# Patient Record
Sex: MALE | Race: WHITE | NOT HISPANIC OR LATINO | Employment: OTHER | URBAN - METROPOLITAN AREA
[De-identification: names, ages, dates, MRNs, and addresses within clinical notes are randomized per-mention and may not be internally consistent; named-entity substitution may affect disease eponyms.]

---

## 2018-03-06 ENCOUNTER — OFFICE VISIT CONVERTED (OUTPATIENT)
Dept: SURGERY | Facility: CLINIC | Age: 68
End: 2018-03-06
Attending: SURGERY

## 2018-03-20 ENCOUNTER — OFFICE VISIT CONVERTED (OUTPATIENT)
Dept: SURGERY | Facility: CLINIC | Age: 68
End: 2018-03-20
Attending: SURGERY

## 2018-07-11 ENCOUNTER — CONVERSION ENCOUNTER (OUTPATIENT)
Dept: GASTROENTEROLOGY | Facility: CLINIC | Age: 68
End: 2018-07-11

## 2018-07-11 ENCOUNTER — OFFICE VISIT CONVERTED (OUTPATIENT)
Dept: GASTROENTEROLOGY | Facility: CLINIC | Age: 68
End: 2018-07-11
Attending: NURSE PRACTITIONER

## 2019-04-25 ENCOUNTER — HOSPITAL ENCOUNTER (OUTPATIENT)
Dept: CARDIAC REHAB | Facility: HOSPITAL | Age: 69
Setting detail: RECURRING SERIES
Discharge: HOME | End: 2019-10-08

## 2020-01-29 ENCOUNTER — OFFICE VISIT CONVERTED (OUTPATIENT)
Dept: SURGERY | Facility: CLINIC | Age: 70
End: 2020-01-29
Attending: SURGERY

## 2020-02-05 ENCOUNTER — OFFICE VISIT CONVERTED (OUTPATIENT)
Dept: SURGERY | Facility: CLINIC | Age: 70
End: 2020-02-05
Attending: SURGERY

## 2020-02-20 ENCOUNTER — HOSPITAL ENCOUNTER (OUTPATIENT)
Dept: OTHER | Facility: HOSPITAL | Age: 70
Discharge: HOME | End: 2020-02-20
Attending: NURSE PRACTITIONER

## 2020-02-20 LAB
C DIFF TOX B STL QL CT TISS CULT: POSITIVE
CONV 027 TOXIN: NEGATIVE

## 2020-02-22 LAB — BACTERIA SPEC AEROBE CULT: NORMAL

## 2020-02-26 ENCOUNTER — OFFICE VISIT CONVERTED (OUTPATIENT)
Dept: SURGERY | Facility: CLINIC | Age: 70
End: 2020-02-26
Attending: SURGERY

## 2021-02-24 ENCOUNTER — HOSPITAL ENCOUNTER (OUTPATIENT)
Dept: VACCINE CLINIC | Facility: HOSPITAL | Age: 71
Discharge: HOME | End: 2021-02-24
Attending: INTERNAL MEDICINE

## 2021-03-25 ENCOUNTER — HOSPITAL ENCOUNTER (OUTPATIENT)
Dept: VACCINE CLINIC | Facility: HOSPITAL | Age: 71
Discharge: HOME | End: 2021-03-25
Attending: INTERNAL MEDICINE

## 2021-04-01 ENCOUNTER — HOSPITAL ENCOUNTER (OUTPATIENT)
Dept: OTHER | Facility: HOSPITAL | Age: 71
Discharge: HOME | End: 2021-04-01
Attending: NURSE PRACTITIONER

## 2021-05-15 VITALS — RESPIRATION RATE: 14 BRPM | BODY MASS INDEX: 26.48 KG/M2 | WEIGHT: 185 LBS | HEIGHT: 70 IN

## 2021-05-15 VITALS — HEIGHT: 70 IN | RESPIRATION RATE: 14 BRPM | BODY MASS INDEX: 27.79 KG/M2 | WEIGHT: 194.12 LBS

## 2021-05-15 VITALS — HEIGHT: 70 IN | WEIGHT: 185 LBS | RESPIRATION RATE: 14 BRPM | BODY MASS INDEX: 26.48 KG/M2

## 2021-05-16 VITALS — BODY MASS INDEX: 28.77 KG/M2 | HEIGHT: 70 IN | WEIGHT: 201 LBS

## 2021-05-16 VITALS
BODY MASS INDEX: 30.64 KG/M2 | TEMPERATURE: 98.8 F | HEART RATE: 68 BPM | DIASTOLIC BLOOD PRESSURE: 55 MMHG | SYSTOLIC BLOOD PRESSURE: 136 MMHG | HEIGHT: 70 IN | WEIGHT: 214 LBS

## 2021-05-16 VITALS — WEIGHT: 201 LBS | HEIGHT: 70 IN | BODY MASS INDEX: 28.77 KG/M2

## 2022-07-21 ENCOUNTER — LAB (OUTPATIENT)
Dept: LAB | Facility: HOSPITAL | Age: 72
End: 2022-07-21

## 2022-07-21 ENCOUNTER — OFFICE VISIT (OUTPATIENT)
Dept: NEUROLOGY | Facility: CLINIC | Age: 72
End: 2022-07-21

## 2022-07-21 VITALS
HEIGHT: 70 IN | HEART RATE: 58 BPM | SYSTOLIC BLOOD PRESSURE: 134 MMHG | DIASTOLIC BLOOD PRESSURE: 74 MMHG | BODY MASS INDEX: 30.11 KG/M2 | WEIGHT: 210.3 LBS

## 2022-07-21 DIAGNOSIS — G60.8 IDIOPATHIC SENSORIMOTOR AXONAL NEUROPATHY: Primary | ICD-10-CM

## 2022-07-21 DIAGNOSIS — G60.8 IDIOPATHIC SENSORIMOTOR AXONAL NEUROPATHY: ICD-10-CM

## 2022-07-21 LAB
ALBUMIN SERPL-MCNC: 4.6 G/DL (ref 3.5–5.2)
ALBUMIN/GLOB SERPL: 1.9 G/DL
ALP SERPL-CCNC: 85 U/L (ref 39–117)
ALT SERPL W P-5'-P-CCNC: 22 U/L (ref 1–41)
ANION GAP SERPL CALCULATED.3IONS-SCNC: 11.3 MMOL/L (ref 5–15)
AST SERPL-CCNC: 26 U/L (ref 1–40)
BILIRUB SERPL-MCNC: 0.6 MG/DL (ref 0–1.2)
BUN SERPL-MCNC: 14 MG/DL (ref 8–23)
BUN/CREAT SERPL: 12.2 (ref 7–25)
CALCIUM SPEC-SCNC: 9.3 MG/DL (ref 8.6–10.5)
CHLORIDE SERPL-SCNC: 103 MMOL/L (ref 98–107)
CO2 SERPL-SCNC: 25.7 MMOL/L (ref 22–29)
CREAT SERPL-MCNC: 1.15 MG/DL (ref 0.76–1.27)
CRP SERPL-MCNC: <0.3 MG/DL (ref 0–0.5)
DEPRECATED RDW RBC AUTO: 39.7 FL (ref 37–54)
EGFRCR SERPLBLD CKD-EPI 2021: 66.8 ML/MIN/1.73
ERYTHROCYTE [DISTWIDTH] IN BLOOD BY AUTOMATED COUNT: 12 % (ref 12.3–15.4)
ERYTHROCYTE [SEDIMENTATION RATE] IN BLOOD: 14 MM/HR (ref 0–20)
GLOBULIN UR ELPH-MCNC: 2.4 GM/DL
GLUCOSE SERPL-MCNC: 96 MG/DL (ref 65–99)
HCT VFR BLD AUTO: 44.2 % (ref 37.5–51)
HGB BLD-MCNC: 15.3 G/DL (ref 13–17.7)
MCH RBC QN AUTO: 31.5 PG (ref 26.6–33)
MCHC RBC AUTO-ENTMCNC: 34.6 G/DL (ref 31.5–35.7)
MCV RBC AUTO: 91.1 FL (ref 79–97)
PLATELET # BLD AUTO: 231 10*3/MM3 (ref 140–450)
PMV BLD AUTO: 9.9 FL (ref 6–12)
POTASSIUM SERPL-SCNC: 4.4 MMOL/L (ref 3.5–5.2)
PROT SERPL-MCNC: 7 G/DL (ref 6–8.5)
RBC # BLD AUTO: 4.85 10*6/MM3 (ref 4.14–5.8)
SODIUM SERPL-SCNC: 140 MMOL/L (ref 136–145)
WBC NRBC COR # BLD: 7.1 10*3/MM3 (ref 3.4–10.8)

## 2022-07-21 PROCEDURE — 85652 RBC SED RATE AUTOMATED: CPT

## 2022-07-21 PROCEDURE — 36415 COLL VENOUS BLD VENIPUNCTURE: CPT

## 2022-07-21 PROCEDURE — 84165 PROTEIN E-PHORESIS SERUM: CPT

## 2022-07-21 PROCEDURE — 86235 NUCLEAR ANTIGEN ANTIBODY: CPT

## 2022-07-21 PROCEDURE — 85027 COMPLETE CBC AUTOMATED: CPT

## 2022-07-21 PROCEDURE — 82607 VITAMIN B-12: CPT

## 2022-07-21 PROCEDURE — 83921 ORGANIC ACID SINGLE QUANT: CPT

## 2022-07-21 PROCEDURE — 86225 DNA ANTIBODY NATIVE: CPT

## 2022-07-21 PROCEDURE — 84425 ASSAY OF VITAMIN B-1: CPT

## 2022-07-21 PROCEDURE — 99204 OFFICE O/P NEW MOD 45 MIN: CPT | Performed by: NURSE PRACTITIONER

## 2022-07-21 PROCEDURE — 86334 IMMUNOFIX E-PHORESIS SERUM: CPT

## 2022-07-21 PROCEDURE — 82784 ASSAY IGA/IGD/IGG/IGM EACH: CPT

## 2022-07-21 PROCEDURE — 86140 C-REACTIVE PROTEIN: CPT

## 2022-07-21 PROCEDURE — 82746 ASSAY OF FOLIC ACID SERUM: CPT

## 2022-07-21 PROCEDURE — 80053 COMPREHEN METABOLIC PANEL: CPT

## 2022-07-21 RX ORDER — CLOPIDOGREL BISULFATE 75 MG/1
1 TABLET ORAL DAILY
COMMUNITY
Start: 2022-02-17

## 2022-07-21 RX ORDER — CHOLECALCIFEROL (VITAMIN D3) 50 MCG
2000 TABLET ORAL DAILY
COMMUNITY

## 2022-07-21 RX ORDER — LISINOPRIL AND HYDROCHLOROTHIAZIDE 20; 12.5 MG/1; MG/1
1 TABLET ORAL DAILY
COMMUNITY
Start: 2022-02-17

## 2022-07-21 RX ORDER — METOPROLOL SUCCINATE 50 MG/1
50 TABLET, EXTENDED RELEASE ORAL DAILY
COMMUNITY

## 2022-07-21 RX ORDER — AMLODIPINE BESYLATE 5 MG/1
1 TABLET ORAL DAILY
COMMUNITY
Start: 2022-02-17

## 2022-07-21 RX ORDER — AMPICILLIN TRIHYDRATE 250 MG
CAPSULE ORAL
COMMUNITY

## 2022-07-21 RX ORDER — ATORVASTATIN CALCIUM 40 MG/1
1 TABLET, FILM COATED ORAL DAILY
COMMUNITY
Start: 2022-02-17

## 2022-07-21 RX ORDER — MULTIPLE VITAMINS W/ MINERALS TAB 9MG-400MCG
1 TAB ORAL DAILY
COMMUNITY

## 2022-07-21 RX ORDER — ASPIRIN 81 MG/1
81 TABLET ORAL DAILY
COMMUNITY

## 2022-07-21 RX ORDER — PANTOPRAZOLE SODIUM 20 MG/1
20 TABLET, DELAYED RELEASE ORAL DAILY
COMMUNITY
Start: 2022-06-13

## 2022-07-22 LAB
FOLATE SERPL-MCNC: >20 NG/ML (ref 4.78–24.2)
VIT B12 BLD-MCNC: 670 PG/ML (ref 211–946)

## 2022-07-23 LAB
CENTROMERE B AB SER-ACNC: <0.2 AI (ref 0–0.9)
CHROMATIN AB SERPL-ACNC: <0.2 AI (ref 0–0.9)
DSDNA AB SER-ACNC: 1 IU/ML (ref 0–9)
ENA JO1 AB SER-ACNC: <0.2 AI (ref 0–0.9)
ENA RNP AB SER-ACNC: <0.2 AI (ref 0–0.9)
ENA SCL70 AB SER-ACNC: <0.2 AI (ref 0–0.9)
ENA SM AB SER-ACNC: <0.2 AI (ref 0–0.9)
ENA SS-A AB SER-ACNC: <0.2 AI (ref 0–0.9)
ENA SS-B AB SER-ACNC: <0.2 AI (ref 0–0.9)
Lab: NORMAL

## 2022-07-25 LAB
ALBUMIN SERPL ELPH-MCNC: 4 G/DL (ref 2.9–4.4)
ALBUMIN/GLOB SERPL: 1.3 {RATIO} (ref 0.7–1.7)
ALPHA1 GLOB SERPL ELPH-MCNC: 0.3 G/DL (ref 0–0.4)
ALPHA2 GLOB SERPL ELPH-MCNC: 0.8 G/DL (ref 0.4–1)
B-GLOBULIN SERPL ELPH-MCNC: 1 G/DL (ref 0.7–1.3)
GAMMA GLOB SERPL ELPH-MCNC: 0.9 G/DL (ref 0.4–1.8)
GLOBULIN SER CALC-MCNC: 3 G/DL (ref 2.2–3.9)
IGA SERPL-MCNC: 177 MG/DL (ref 61–437)
IGG SERPL-MCNC: 795 MG/DL (ref 603–1613)
IGM SERPL-MCNC: 84 MG/DL (ref 15–143)
LABORATORY COMMENT REPORT: NORMAL
M PROTEIN SERPL ELPH-MCNC: NORMAL G/DL
PROT PATTERN SERPL ELPH-IMP: NORMAL
PROT PATTERN SERPL IFE-IMP: NORMAL
PROT SERPL-MCNC: 7 G/DL (ref 6–8.5)

## 2022-07-26 LAB — VIT B1 BLD-SCNC: 177.5 NMOL/L (ref 66.5–200)

## 2022-07-27 LAB — METHYLMALONATE SERPL-SCNC: 173 NMOL/L (ref 0–378)

## 2022-09-21 ENCOUNTER — OFFICE VISIT (OUTPATIENT)
Dept: NEUROLOGY | Facility: CLINIC | Age: 72
End: 2022-09-21

## 2022-09-21 VITALS
SYSTOLIC BLOOD PRESSURE: 142 MMHG | HEIGHT: 70 IN | BODY MASS INDEX: 30.45 KG/M2 | HEART RATE: 58 BPM | DIASTOLIC BLOOD PRESSURE: 69 MMHG | WEIGHT: 212.7 LBS

## 2022-09-21 DIAGNOSIS — I73.9 INTERMITTENT CLAUDICATION: ICD-10-CM

## 2022-09-21 DIAGNOSIS — G60.8 IDIOPATHIC SENSORIMOTOR AXONAL NEUROPATHY: Primary | ICD-10-CM

## 2022-09-21 PROCEDURE — 99214 OFFICE O/P EST MOD 30 MIN: CPT | Performed by: NURSE PRACTITIONER

## 2022-09-23 PROBLEM — I73.9 INTERMITTENT CLAUDICATION: Status: ACTIVE | Noted: 2022-09-23

## 2022-10-12 ENCOUNTER — HOSPITAL ENCOUNTER (OUTPATIENT)
Dept: CARDIOLOGY | Facility: HOSPITAL | Age: 72
Discharge: HOME | End: 2022-10-12
Admitting: NURSE PRACTITIONER

## 2022-10-12 DIAGNOSIS — I73.9 INTERMITTENT CLAUDICATION: ICD-10-CM

## 2022-10-12 LAB
BH CV LOWER ARTERIAL LEFT DORSALIS PEDIS SYS MAX: 169
BH CV LOWER ARTERIAL LEFT GREAT TOE SYS MAX: 149
BH CV LOWER ARTERIAL LEFT POST TIBIAL SYS MAX: 155
BH CV LOWER ARTERIAL RIGHT DORSALIS PEDIS SYS MAX: 167
BH CV LOWER ARTERIAL RIGHT GREAT TOE SYS MAX: 121
BH CV LOWER ARTERIAL RIGHT POST TIBIAL SYS MAX: 168
MAXIMAL PREDICTED HEART RATE: 146 BPM
STRESS TARGET HR: 124 BPM
UPPER ARTERIAL LEFT ARM BRACHIAL SYS MAX: 121 MMHG
UPPER ARTERIAL RIGHT ARM BRACHIAL SYS MAX: 132 MMHG

## 2022-10-12 PROCEDURE — 93922 UPR/L XTREMITY ART 2 LEVELS: CPT | Performed by: SURGERY

## 2022-10-12 PROCEDURE — 93922 UPR/L XTREMITY ART 2 LEVELS: CPT

## 2022-11-22 ENCOUNTER — OFFICE VISIT (OUTPATIENT)
Dept: NEUROLOGY | Facility: CLINIC | Age: 72
End: 2022-11-22

## 2022-11-22 VITALS
SYSTOLIC BLOOD PRESSURE: 134 MMHG | HEIGHT: 70 IN | DIASTOLIC BLOOD PRESSURE: 68 MMHG | WEIGHT: 216.5 LBS | HEART RATE: 78 BPM | BODY MASS INDEX: 30.99 KG/M2

## 2022-11-22 DIAGNOSIS — G60.8 IDIOPATHIC SENSORIMOTOR AXONAL NEUROPATHY: Primary | ICD-10-CM

## 2022-11-22 PROBLEM — I73.9 INTERMITTENT CLAUDICATION (HCC): Status: RESOLVED | Noted: 2022-09-23 | Resolved: 2022-11-22

## 2022-11-22 PROCEDURE — 99214 OFFICE O/P EST MOD 30 MIN: CPT | Performed by: NURSE PRACTITIONER

## 2022-11-22 RX ORDER — GABAPENTIN 300 MG/1
300 CAPSULE ORAL DAILY
Qty: 90 CAPSULE | Refills: 1 | Status: SHIPPED | OUTPATIENT
Start: 2022-11-22 | End: 2023-03-22 | Stop reason: SDUPTHER

## 2023-03-17 ENCOUNTER — CLINICAL SUPPORT (OUTPATIENT)
Dept: GASTROENTEROLOGY | Facility: CLINIC | Age: 73
End: 2023-03-17

## 2023-03-17 ENCOUNTER — PREP FOR SURGERY (OUTPATIENT)
Dept: OTHER | Facility: HOSPITAL | Age: 73
End: 2023-03-17
Payer: MEDICARE

## 2023-03-17 DIAGNOSIS — Z86.010 HISTORY OF COLON POLYPS: Primary | ICD-10-CM

## 2023-03-20 PROBLEM — Z86.010 HISTORY OF COLON POLYPS: Status: ACTIVE | Noted: 2023-03-20

## 2023-03-20 PROBLEM — Z86.0100 HISTORY OF COLON POLYPS: Status: ACTIVE | Noted: 2023-03-20

## 2023-03-22 ENCOUNTER — OFFICE VISIT (OUTPATIENT)
Dept: NEUROLOGY | Facility: CLINIC | Age: 73
End: 2023-03-22
Payer: MEDICARE

## 2023-03-22 VITALS
HEART RATE: 77 BPM | BODY MASS INDEX: 32.35 KG/M2 | DIASTOLIC BLOOD PRESSURE: 74 MMHG | HEIGHT: 70 IN | SYSTOLIC BLOOD PRESSURE: 145 MMHG | WEIGHT: 226 LBS

## 2023-03-22 DIAGNOSIS — G60.8 IDIOPATHIC SENSORIMOTOR AXONAL NEUROPATHY: Primary | ICD-10-CM

## 2023-03-22 PROCEDURE — 1159F MED LIST DOCD IN RCRD: CPT | Performed by: NURSE PRACTITIONER

## 2023-03-22 PROCEDURE — 1160F RVW MEDS BY RX/DR IN RCRD: CPT | Performed by: NURSE PRACTITIONER

## 2023-03-22 PROCEDURE — 99214 OFFICE O/P EST MOD 30 MIN: CPT | Performed by: NURSE PRACTITIONER

## 2023-03-22 RX ORDER — GABAPENTIN 300 MG/1
300 CAPSULE ORAL 3 TIMES DAILY
Qty: 30 CAPSULE | Refills: 5 | Status: SHIPPED | OUTPATIENT
Start: 2023-03-22

## 2023-03-23 ENCOUNTER — TELEPHONE (OUTPATIENT)
Dept: GASTROENTEROLOGY | Facility: CLINIC | Age: 73
End: 2023-03-23
Payer: MEDICARE

## 2023-04-26 ENCOUNTER — TELEPHONE (OUTPATIENT)
Dept: NEUROLOGY | Facility: CLINIC | Age: 73
End: 2023-04-26
Payer: MEDICARE

## 2023-04-27 DIAGNOSIS — G60.8 IDIOPATHIC SENSORIMOTOR AXONAL NEUROPATHY: ICD-10-CM

## 2023-04-27 RX ORDER — GABAPENTIN 300 MG/1
300 CAPSULE ORAL 3 TIMES DAILY
Qty: 90 CAPSULE | Refills: 5 | Status: SHIPPED | OUTPATIENT
Start: 2023-04-27

## 2023-06-15 ENCOUNTER — HOSPITAL ENCOUNTER (OUTPATIENT)
Facility: HOSPITAL | Age: 73
Setting detail: HOSPITAL OUTPATIENT SURGERY
Discharge: HOME | End: 2023-06-15
Attending: INTERNAL MEDICINE | Admitting: INTERNAL MEDICINE
Payer: MEDICARE

## 2023-06-15 ENCOUNTER — ANESTHESIA EVENT (OUTPATIENT)
Dept: GASTROENTEROLOGY | Facility: HOSPITAL | Age: 73
End: 2023-06-15
Payer: MEDICARE

## 2023-06-15 ENCOUNTER — ANESTHESIA (OUTPATIENT)
Dept: GASTROENTEROLOGY | Facility: HOSPITAL | Age: 73
End: 2023-06-15
Payer: MEDICARE

## 2023-06-15 VITALS
SYSTOLIC BLOOD PRESSURE: 86 MMHG | TEMPERATURE: 98.7 F | BODY MASS INDEX: 30.3 KG/M2 | RESPIRATION RATE: 15 BRPM | WEIGHT: 211.64 LBS | HEART RATE: 71 BPM | DIASTOLIC BLOOD PRESSURE: 64 MMHG | HEIGHT: 70 IN | OXYGEN SATURATION: 87 %

## 2023-06-15 DIAGNOSIS — Z86.010 HISTORY OF COLON POLYPS: ICD-10-CM

## 2023-06-15 PROCEDURE — 88305 TISSUE EXAM BY PATHOLOGIST: CPT | Performed by: INTERNAL MEDICINE

## 2023-06-15 PROCEDURE — 25010000002 PROPOFOL 10 MG/ML EMULSION: Performed by: NURSE ANESTHETIST, CERTIFIED REGISTERED

## 2023-06-15 RX ORDER — SODIUM CHLORIDE, SODIUM LACTATE, POTASSIUM CHLORIDE, CALCIUM CHLORIDE 600; 310; 30; 20 MG/100ML; MG/100ML; MG/100ML; MG/100ML
30 INJECTION, SOLUTION INTRAVENOUS CONTINUOUS
Status: DISCONTINUED | OUTPATIENT
Start: 2023-06-15 | End: 2023-06-15 | Stop reason: HOSPADM

## 2023-06-15 RX ORDER — PROPOFOL 10 MG/ML
VIAL (ML) INTRAVENOUS AS NEEDED
Status: DISCONTINUED | OUTPATIENT
Start: 2023-06-15 | End: 2023-06-15 | Stop reason: SURG

## 2023-06-15 RX ORDER — LIDOCAINE HYDROCHLORIDE 20 MG/ML
INJECTION, SOLUTION EPIDURAL; INFILTRATION; INTRACAUDAL; PERINEURAL AS NEEDED
Status: DISCONTINUED | OUTPATIENT
Start: 2023-06-15 | End: 2023-06-15 | Stop reason: SURG

## 2023-06-15 RX ADMIN — PROPOFOL 200 MCG/KG/MIN: 10 INJECTION, EMULSION INTRAVENOUS at 10:36

## 2023-06-15 RX ADMIN — SODIUM CHLORIDE, POTASSIUM CHLORIDE, SODIUM LACTATE AND CALCIUM CHLORIDE: 600; 310; 30; 20 INJECTION, SOLUTION INTRAVENOUS at 10:35

## 2023-06-15 RX ADMIN — LIDOCAINE HYDROCHLORIDE 100 MG: 20 INJECTION, SOLUTION EPIDURAL; INFILTRATION; INTRACAUDAL; PERINEURAL at 10:35

## 2023-06-15 RX ADMIN — PROPOFOL 100 MG: 10 INJECTION, EMULSION INTRAVENOUS at 10:35

## 2023-06-16 LAB
CYTO UR: NORMAL
LAB AP CASE REPORT: NORMAL
LAB AP CLINICAL INFORMATION: NORMAL
PATH REPORT.FINAL DX SPEC: NORMAL
PATH REPORT.GROSS SPEC: NORMAL

## 2023-08-02 ENCOUNTER — OFFICE VISIT (OUTPATIENT)
Dept: NEUROLOGY | Facility: CLINIC | Age: 73
End: 2023-08-02
Payer: MEDICARE

## 2023-08-02 VITALS
WEIGHT: 218.7 LBS | BODY MASS INDEX: 31.31 KG/M2 | SYSTOLIC BLOOD PRESSURE: 133 MMHG | DIASTOLIC BLOOD PRESSURE: 72 MMHG | HEART RATE: 56 BPM | HEIGHT: 70 IN

## 2023-08-02 DIAGNOSIS — G60.8 IDIOPATHIC SENSORIMOTOR AXONAL NEUROPATHY: ICD-10-CM

## 2023-08-02 RX ORDER — GABAPENTIN 300 MG/1
300 CAPSULE ORAL 3 TIMES DAILY
Qty: 90 CAPSULE | Refills: 5 | Status: SHIPPED | OUTPATIENT
Start: 2023-08-02

## 2024-01-24 ENCOUNTER — TELEPHONE (OUTPATIENT)
Dept: NEUROLOGY | Facility: CLINIC | Age: 74
End: 2024-01-24
Payer: MEDICARE

## 2024-01-24 DIAGNOSIS — G60.8 IDIOPATHIC SENSORIMOTOR AXONAL NEUROPATHY: ICD-10-CM

## 2024-01-24 RX ORDER — GABAPENTIN 300 MG/1
300 CAPSULE ORAL 3 TIMES DAILY
Qty: 90 CAPSULE | Refills: 5 | OUTPATIENT
Start: 2024-01-24

## 2024-01-31 DIAGNOSIS — G60.8 IDIOPATHIC SENSORIMOTOR AXONAL NEUROPATHY: ICD-10-CM

## 2024-01-31 RX ORDER — GABAPENTIN 300 MG/1
300 CAPSULE ORAL 3 TIMES DAILY
Qty: 90 CAPSULE | Refills: 0 | Status: SHIPPED | OUTPATIENT
Start: 2024-01-31

## 2024-02-08 ENCOUNTER — OFFICE VISIT (OUTPATIENT)
Dept: NEUROLOGY | Facility: CLINIC | Age: 74
End: 2024-02-08
Payer: MEDICARE

## 2024-02-08 VITALS
BODY MASS INDEX: 32.21 KG/M2 | WEIGHT: 225 LBS | DIASTOLIC BLOOD PRESSURE: 67 MMHG | HEIGHT: 70 IN | SYSTOLIC BLOOD PRESSURE: 131 MMHG | HEART RATE: 57 BPM

## 2024-02-08 DIAGNOSIS — G60.8 IDIOPATHIC SENSORIMOTOR AXONAL NEUROPATHY: Primary | ICD-10-CM

## 2024-02-08 PROCEDURE — 99213 OFFICE O/P EST LOW 20 MIN: CPT | Performed by: NURSE PRACTITIONER

## 2024-02-08 PROCEDURE — 1160F RVW MEDS BY RX/DR IN RCRD: CPT | Performed by: NURSE PRACTITIONER

## 2024-02-08 PROCEDURE — 1159F MED LIST DOCD IN RCRD: CPT | Performed by: NURSE PRACTITIONER

## 2024-02-08 RX ORDER — DIPHENOXYLATE HYDROCHLORIDE AND ATROPINE SULFATE 2.5; .025 MG/1; MG/1
1 TABLET ORAL DAILY
COMMUNITY

## 2024-02-08 RX ORDER — GABAPENTIN 300 MG/1
300 CAPSULE ORAL 3 TIMES DAILY
Qty: 90 CAPSULE | Refills: 5 | Status: SHIPPED | OUTPATIENT
Start: 2024-02-08

## 2024-08-27 DIAGNOSIS — G60.8 IDIOPATHIC SENSORIMOTOR AXONAL NEUROPATHY: ICD-10-CM

## 2024-08-28 RX ORDER — GABAPENTIN 300 MG/1
300 CAPSULE ORAL 3 TIMES DAILY
Qty: 90 CAPSULE | Refills: 3 | Status: SHIPPED | OUTPATIENT
Start: 2024-08-28

## 2024-11-07 ENCOUNTER — OFFICE VISIT (OUTPATIENT)
Dept: NEUROLOGY | Facility: CLINIC | Age: 74
End: 2024-11-07
Payer: MEDICARE

## 2024-11-07 VITALS
BODY MASS INDEX: 31.95 KG/M2 | WEIGHT: 223.2 LBS | HEART RATE: 63 BPM | HEIGHT: 70 IN | DIASTOLIC BLOOD PRESSURE: 64 MMHG | SYSTOLIC BLOOD PRESSURE: 131 MMHG

## 2024-11-07 DIAGNOSIS — G60.8 IDIOPATHIC SENSORIMOTOR AXONAL NEUROPATHY: Primary | ICD-10-CM

## 2024-11-07 RX ORDER — GABAPENTIN 300 MG/1
300 CAPSULE ORAL 3 TIMES DAILY
Qty: 90 CAPSULE | Refills: 5 | Status: SHIPPED | OUTPATIENT
Start: 2024-11-07

## 2024-11-07 NOTE — PROGRESS NOTES
"Chief Complaint  Neurologic Problem    Subjective          Ruiz Boyd presents to [facility]  History of Present Illness    History of Present Illness  The patient is a 74-year-old male who presents to the office for follow-up for neuropathy, remains on gabapentin 300 mg up to three times a day.    He reports that his condition is stable and he is managing well. He continues to take gabapentin twice daily, occasionally increasing to three times daily. He notes that his pain intensifies at night if he has not engaged in sufficient physical activity during the day.       Objective   Vital Signs:   /64   Pulse 63   Ht 177.8 cm (70\")   Wt 101 kg (223 lb 3.2 oz)   BMI 32.03 kg/m²     Physical Exam  HENT:      Head: Normocephalic.   Pulmonary:      Effort: Pulmonary effort is normal.   Neurological:      Mental Status: He is alert and oriented to person, place, and time.      Motor: Motor function is intact.      Neurological Exam  Mental Status  Alert. Oriented to person, place, and time.      Result Review :               Assessment and Plan    Diagnoses and all orders for this visit:    1. Idiopathic sensorimotor axonal neuropathy (Primary)  -     gabapentin (NEURONTIN) 300 MG capsule; Take 1 capsule by mouth 3 (Three) Times a Day.  Dispense: 90 capsule; Refill: 5        Assessment & Plan  1. Neuropathy.  The current regimen of gabapentin 300 mg up to three times a day will be maintained. There is room to increase the dosage in the future if needed. A refill of the prescription will be sent to the mail order pharmacy. He is advised to inform if his pain becomes poorly controlled or if there are any changes in his condition.           Follow Up   Return in about 6 months (around 5/7/2025) for neuropathy.  Patient was given instructions and counseling regarding his condition or for health maintenance advice. Please see specific information pulled into the AVS if " appropriate.       Patient or patient representative verbalized consent for the use of Ambient Listening during the visit with  BRITTANEY Hitchcock for chart documentation. 11/7/2024  08:22 EST

## 2025-01-14 ENCOUNTER — OFFICE VISIT (OUTPATIENT)
Dept: FAMILY MEDICINE CLINIC | Age: 75
End: 2025-01-14
Payer: MEDICARE

## 2025-01-14 ENCOUNTER — HOSPITAL ENCOUNTER (OUTPATIENT)
Dept: GENERAL RADIOLOGY | Facility: HOSPITAL | Age: 75
Discharge: HOME | End: 2025-01-14
Admitting: NURSE PRACTITIONER
Payer: MEDICARE

## 2025-01-14 VITALS
HEART RATE: 56 BPM | BODY MASS INDEX: 32.13 KG/M2 | TEMPERATURE: 98.2 F | DIASTOLIC BLOOD PRESSURE: 58 MMHG | SYSTOLIC BLOOD PRESSURE: 113 MMHG | OXYGEN SATURATION: 99 % | WEIGHT: 212 LBS | HEIGHT: 68 IN

## 2025-01-14 DIAGNOSIS — M25.532 LEFT WRIST PAIN: ICD-10-CM

## 2025-01-14 DIAGNOSIS — M79.642 LEFT HAND PAIN: ICD-10-CM

## 2025-01-14 DIAGNOSIS — Z79.899 HIGH RISK MEDICATION USE: ICD-10-CM

## 2025-01-14 DIAGNOSIS — R73.09 ELEVATED GLUCOSE: ICD-10-CM

## 2025-01-14 DIAGNOSIS — M79.5 FOREIGN BODY (FB) IN SOFT TISSUE: ICD-10-CM

## 2025-01-14 DIAGNOSIS — E11.65 TYPE 2 DIABETES MELLITUS WITH HYPERGLYCEMIA, WITHOUT LONG-TERM CURRENT USE OF INSULIN: ICD-10-CM

## 2025-01-14 DIAGNOSIS — E78.2 MIXED HYPERLIPIDEMIA: ICD-10-CM

## 2025-01-14 DIAGNOSIS — G60.8 IDIOPATHIC SENSORIMOTOR AXONAL NEUROPATHY: ICD-10-CM

## 2025-01-14 DIAGNOSIS — K21.9 GASTROESOPHAGEAL REFLUX DISEASE, UNSPECIFIED WHETHER ESOPHAGITIS PRESENT: ICD-10-CM

## 2025-01-14 DIAGNOSIS — I10 PRIMARY HYPERTENSION: ICD-10-CM

## 2025-01-14 DIAGNOSIS — Z12.5 PROSTATE CANCER SCREENING: Primary | ICD-10-CM

## 2025-01-14 PROBLEM — K76.0 STEATOSIS OF LIVER: Status: ACTIVE | Noted: 2019-02-28

## 2025-01-14 PROBLEM — I25.10 CORONARY ARTERY DISEASE INVOLVING NATIVE CORONARY ARTERY OF NATIVE HEART WITHOUT ANGINA PECTORIS: Status: ACTIVE | Noted: 2025-01-14

## 2025-01-14 PROBLEM — H91.93 BILATERAL HEARING LOSS: Status: ACTIVE | Noted: 2025-01-14

## 2025-01-14 LAB
AMPHET+METHAMPHET UR QL: NEGATIVE
AMPHETAMINES UR QL: NEGATIVE
BARBITURATES UR QL SCN: NEGATIVE
BENZODIAZ UR QL SCN: NEGATIVE
BUPRENORPHINE SERPL-MCNC: NEGATIVE NG/ML
CANNABINOIDS SERPL QL: NEGATIVE
COCAINE UR QL: NEGATIVE
EXPIRATION DATE: NORMAL
Lab: NORMAL
MDMA UR QL SCN: NEGATIVE
METHADONE UR QL SCN: NEGATIVE
MORPHINE/OPIATES SCREEN, URINE: NEGATIVE
OXYCODONE UR QL SCN: NEGATIVE
PCP UR QL SCN: NEGATIVE

## 2025-01-14 PROCEDURE — 3074F SYST BP LT 130 MM HG: CPT | Performed by: NURSE PRACTITIONER

## 2025-01-14 PROCEDURE — 73110 X-RAY EXAM OF WRIST: CPT

## 2025-01-14 PROCEDURE — 73130 X-RAY EXAM OF HAND: CPT

## 2025-01-14 PROCEDURE — 1159F MED LIST DOCD IN RCRD: CPT | Performed by: NURSE PRACTITIONER

## 2025-01-14 PROCEDURE — 3078F DIAST BP <80 MM HG: CPT | Performed by: NURSE PRACTITIONER

## 2025-01-14 PROCEDURE — 1160F RVW MEDS BY RX/DR IN RCRD: CPT | Performed by: NURSE PRACTITIONER

## 2025-01-14 RX ORDER — METOPROLOL SUCCINATE 50 MG/1
50 TABLET, EXTENDED RELEASE ORAL DAILY
Qty: 90 TABLET | Refills: 3 | Status: SHIPPED | OUTPATIENT
Start: 2025-01-14

## 2025-01-14 NOTE — ASSESSMENT & PLAN NOTE
continue atorvastatin 40 mg and co-Q10 daily.  Heart healthy diet.    Orders:    Comprehensive Metabolic Panel; Future    Lipid Panel; Future

## 2025-01-14 NOTE — ASSESSMENT & PLAN NOTE
Continue gabapentin 300 mg 3 times daily.  Tolerating well.  Patient states it is very helpful.

## 2025-01-14 NOTE — ASSESSMENT & PLAN NOTE
well-controlled.  Continue amlodipine 5 mg, lisinopril/HCTZ 20/12.5 mg and Toprol-XL 50 mg daily.  Low-sodium diet.  Continue follow-up with cardiology.    Orders:    CBC Auto Differential; Future    TSH Rfx On Abnormal To Free T4; Future    Lipid Panel; Future    Comprehensive Metabolic Panel; Future

## 2025-01-14 NOTE — PROGRESS NOTES
"Chief Complaint  Establish Care and Wrist Injury (Fell on ice, hurt left wrist)    Subjective          Ruiz Boyd presents to [facility] wishing to establish care.      He is taking amlodipine 5 mg daily, lisinopril/HCTZ 20/12.5 mg daily and Toprol-XL 50 mg daily for hypertension.  Blood pressure is well-controlled today.    He sees cardiology routinely, BRITTANEY Ma,  and is taking Plavix 75 mg daily.  He is also on aspirin 81 mg daily.  Patient has known history of CAD with previous PCI's of the LAD and left circumflex.     He is taking atorvastatin 40 mg and co-Q10 daily for hyperlipidemia.    GERD controlled with Protonix 20 mg daily.    Patient is taking gabapentin 300 mg 3 times a day for idiopathic sensorimotor axonal neuropathy (mostly in the legs).  Patient states it is working well and he is not experiencing any adverse reactions.  He sees BRITTANEY Black with neurology. He has been on this medication for 2-3 years. Would like primary care to take over rx.     Has history of colon polyps and recently had a colonoscopy on 6/15/2023, with Dr. Fontaine. Repeat in 3 years.     Patient has an acute complaint of left wrist pain.  He fell on the ice on yesterday. Had also injured the same hand with his wife's wheelchair 3 weeks ago.     Medical, surgical, family and social history reviewed and updated in chart.     Objective   Vital Signs:   Vitals:    01/14/25 1000   BP: 113/58   Pulse: 56   Temp: 98.2 °F (36.8 °C)   TempSrc: Oral   SpO2: 99%   Weight: 96.2 kg (212 lb)   Height: 171.5 cm (67.5\")       Body mass index is 32.71 kg/m².         Physical Exam  Vitals reviewed.   Constitutional:       General: He is not in acute distress.     Appearance: Normal appearance. He is well-developed.   HENT:      Head: Normocephalic and atraumatic.   Eyes:      Conjunctiva/sclera: Conjunctivae normal.      Pupils: Pupils are equal, round, and reactive to light. "   Cardiovascular:      Rate and Rhythm: Normal rate and regular rhythm.      Heart sounds: Normal heart sounds. No murmur heard.  Pulmonary:      Effort: Pulmonary effort is normal. No respiratory distress.      Breath sounds: Normal breath sounds.   Musculoskeletal:      Comments: Bruising, mild tenderness and edema to left hand and wrist.  Normal range of motion.   Skin:     General: Skin is warm and dry.   Neurological:      Mental Status: He is alert and oriented to person, place, and time.   Psychiatric:         Mood and Affect: Mood and affect normal.         Behavior: Behavior normal.         Thought Content: Thought content normal.         Judgment: Judgment normal.                     Assessment and Plan    Assessment & Plan  Prostate cancer screening  Will notify patient of results and treat accordingly.  Orders:    PSA Screen; Future    Primary hypertension   well-controlled.  Continue amlodipine 5 mg, lisinopril/HCTZ 20/12.5 mg and Toprol-XL 50 mg daily.  Low-sodium diet.  Continue follow-up with cardiology.    Orders:    CBC Auto Differential; Future    TSH Rfx On Abnormal To Free T4; Future    Lipid Panel; Future    Comprehensive Metabolic Panel; Future    Mixed hyperlipidemia    continue atorvastatin 40 mg and co-Q10 daily.  Heart healthy diet.    Orders:    Comprehensive Metabolic Panel; Future    Lipid Panel; Future    High risk medication use  Wolfgang reviewed. Drug screen appropriate. Low risk for abuse.  Medication sent. Risk and benefits of controlled medication discussed and controlled substance agreement was signed.    Orders:    POC Medline 12 Panel Urine Drug Screen    Left wrist pain  Will notify patient of results and treat accordingly.  Elevate and ice 20 minutes at a time at least 4 times daily.  Orders:    XR Wrist 3+ View Left; Future    Left hand pain  See above  Orders:    XR Hand 3+ View Left; Future    Idiopathic sensorimotor axonal neuropathy  Continue gabapentin 300 mg 3 times  daily.  Tolerating well.  Patient states it is very helpful.       Gastroesophageal reflux disease, unspecified whether esophagitis present  Controlled.  Continue Protonix 20 mg daily.         Patient to notify office with any acute concerns or issues.  Patient verbalizes understanding, agrees with plan of care and has no further questions upon discharge.    Please note that portions of this note were completed with a voice recognition program.      Follow Up    Return in about 6 months (around 7/14/2025) for Recheck.  Patient was given instructions and counseling regarding his condition or for health maintenance advice. Please see specific information pulled into the AVS if appropriate.     Medications Discontinued During This Encounter   Medication Reason    multivitamin with minerals tablet tablet Historical Med - Therapy completed    metoprolol succinate XL (TOPROL-XL) 50 MG 24 hr tablet Reorder

## 2025-01-17 ENCOUNTER — PATIENT ROUNDING (BHMG ONLY) (OUTPATIENT)
Dept: FAMILY MEDICINE CLINIC | Age: 75
End: 2025-01-17
Payer: MEDICARE

## 2025-01-17 ENCOUNTER — LAB (OUTPATIENT)
Dept: LAB | Facility: HOSPITAL | Age: 75
End: 2025-01-17
Payer: MEDICARE

## 2025-01-17 ENCOUNTER — TELEPHONE (OUTPATIENT)
Dept: ORTHOPEDIC SURGERY | Facility: CLINIC | Age: 75
End: 2025-01-17
Payer: MEDICARE

## 2025-01-17 DIAGNOSIS — Z12.5 PROSTATE CANCER SCREENING: ICD-10-CM

## 2025-01-17 DIAGNOSIS — I10 PRIMARY HYPERTENSION: ICD-10-CM

## 2025-01-17 DIAGNOSIS — R73.09 ELEVATED GLUCOSE: ICD-10-CM

## 2025-01-17 LAB
ALBUMIN SERPL-MCNC: 3.8 G/DL (ref 3.5–5.2)
ALBUMIN/GLOB SERPL: 1.4 G/DL
ALP SERPL-CCNC: 80 U/L (ref 39–117)
ALT SERPL W P-5'-P-CCNC: 19 U/L (ref 1–41)
ANION GAP SERPL CALCULATED.3IONS-SCNC: 9.7 MMOL/L (ref 5–15)
AST SERPL-CCNC: 24 U/L (ref 1–40)
BASOPHILS # BLD AUTO: 0.02 10*3/MM3 (ref 0–0.2)
BASOPHILS NFR BLD AUTO: 0.4 % (ref 0–1.5)
BILIRUB SERPL-MCNC: 0.5 MG/DL (ref 0–1.2)
BUN SERPL-MCNC: 13 MG/DL (ref 8–23)
BUN/CREAT SERPL: 11.2 (ref 7–25)
CALCIUM SPEC-SCNC: 9 MG/DL (ref 8.6–10.5)
CHLORIDE SERPL-SCNC: 105 MMOL/L (ref 98–107)
CHOLEST SERPL-MCNC: 113 MG/DL (ref 0–200)
CO2 SERPL-SCNC: 26.3 MMOL/L (ref 22–29)
CREAT SERPL-MCNC: 1.16 MG/DL (ref 0.76–1.27)
DEPRECATED RDW RBC AUTO: 43.2 FL (ref 37–54)
EGFRCR SERPLBLD CKD-EPI 2021: 65.3 ML/MIN/1.73
EOSINOPHIL # BLD AUTO: 0.17 10*3/MM3 (ref 0–0.4)
EOSINOPHIL NFR BLD AUTO: 3.2 % (ref 0.3–6.2)
ERYTHROCYTE [DISTWIDTH] IN BLOOD BY AUTOMATED COUNT: 12.6 % (ref 12.3–15.4)
GLOBULIN UR ELPH-MCNC: 2.7 GM/DL
GLUCOSE SERPL-MCNC: 129 MG/DL (ref 65–99)
HCT VFR BLD AUTO: 42.2 % (ref 37.5–51)
HDLC SERPL-MCNC: 42 MG/DL (ref 40–60)
HGB BLD-MCNC: 13.9 G/DL (ref 13–17.7)
IMM GRANULOCYTES # BLD AUTO: 0 10*3/MM3 (ref 0–0.05)
IMM GRANULOCYTES NFR BLD AUTO: 0 % (ref 0–0.5)
LDLC SERPL CALC-MCNC: 57 MG/DL (ref 0–100)
LDLC/HDLC SERPL: 1.4 {RATIO}
LYMPHOCYTES # BLD AUTO: 1.31 10*3/MM3 (ref 0.7–3.1)
LYMPHOCYTES NFR BLD AUTO: 24.8 % (ref 19.6–45.3)
MCH RBC QN AUTO: 30.5 PG (ref 26.6–33)
MCHC RBC AUTO-ENTMCNC: 32.9 G/DL (ref 31.5–35.7)
MCV RBC AUTO: 92.5 FL (ref 79–97)
MONOCYTES # BLD AUTO: 0.78 10*3/MM3 (ref 0.1–0.9)
MONOCYTES NFR BLD AUTO: 14.7 % (ref 5–12)
NEUTROPHILS NFR BLD AUTO: 3.01 10*3/MM3 (ref 1.7–7)
NEUTROPHILS NFR BLD AUTO: 56.9 % (ref 42.7–76)
PLATELET # BLD AUTO: 258 10*3/MM3 (ref 140–450)
PMV BLD AUTO: 9.9 FL (ref 6–12)
POTASSIUM SERPL-SCNC: 4.4 MMOL/L (ref 3.5–5.2)
PROT SERPL-MCNC: 6.5 G/DL (ref 6–8.5)
PSA SERPL-MCNC: 0.56 NG/ML (ref 0–4)
RBC # BLD AUTO: 4.56 10*6/MM3 (ref 4.14–5.8)
SODIUM SERPL-SCNC: 141 MMOL/L (ref 136–145)
TRIGL SERPL-MCNC: 62 MG/DL (ref 0–150)
TSH SERPL DL<=0.05 MIU/L-ACNC: 2.27 UIU/ML (ref 0.27–4.2)
VLDLC SERPL-MCNC: 14 MG/DL (ref 5–40)
WBC NRBC COR # BLD AUTO: 5.29 10*3/MM3 (ref 3.4–10.8)

## 2025-01-17 PROCEDURE — 84443 ASSAY THYROID STIM HORMONE: CPT

## 2025-01-17 PROCEDURE — 80053 COMPREHEN METABOLIC PANEL: CPT

## 2025-01-17 PROCEDURE — 83036 HEMOGLOBIN GLYCOSYLATED A1C: CPT

## 2025-01-17 PROCEDURE — 80061 LIPID PANEL: CPT

## 2025-01-17 PROCEDURE — G0103 PSA SCREENING: HCPCS

## 2025-01-17 PROCEDURE — 36415 COLL VENOUS BLD VENIPUNCTURE: CPT

## 2025-01-17 PROCEDURE — 85025 COMPLETE CBC W/AUTO DIFF WBC: CPT

## 2025-01-17 NOTE — PROGRESS NOTES
January 17, 2025    Hello, may I speak with Ruiz Boyd?    My name is AJ      I am  with [facility]  [address] [facility] [address]  [place] [address] [phone].    Before we get started may I verify your date of birth? 1950    I am calling to officially welcome you to our practice and ask about your recent visit. Is this a good time to talk? YES    Tell me about your visit with us. What things went well? I was pleased with my visit. Coco was nice. She answered my questions.       We're always looking for ways to make our patients' experiences even better. Do you have recommendations on ways we may improve? NO    Overall were you satisfied with your first visit to our practice? YES       I appreciate you taking the time to speak with me today. Is there anything else I can do for you? NO      Thank you, and have a great day.

## 2025-01-20 PROBLEM — E11.65 TYPE 2 DIABETES MELLITUS WITH HYPERGLYCEMIA, WITHOUT LONG-TERM CURRENT USE OF INSULIN: Status: ACTIVE | Noted: 2025-01-20

## 2025-01-20 LAB — HBA1C MFR BLD: 6.7 % (ref 4.8–5.6)

## 2025-01-23 ENCOUNTER — OFFICE VISIT (OUTPATIENT)
Dept: ORTHOPEDIC SURGERY | Facility: CLINIC | Age: 75
End: 2025-01-23
Payer: MEDICARE

## 2025-01-23 VITALS
HEART RATE: 68 BPM | HEIGHT: 68 IN | OXYGEN SATURATION: 93 % | DIASTOLIC BLOOD PRESSURE: 61 MMHG | BODY MASS INDEX: 32.13 KG/M2 | SYSTOLIC BLOOD PRESSURE: 114 MMHG | WEIGHT: 212 LBS

## 2025-01-23 DIAGNOSIS — S60.212A CONTUSION OF LEFT WRIST, INITIAL ENCOUNTER: Primary | ICD-10-CM

## 2025-01-23 DIAGNOSIS — M25.532 LEFT WRIST PAIN: ICD-10-CM

## 2025-01-23 NOTE — PROGRESS NOTES
"Chief Complaint  Initial Evaluation of the Left Hand     Subjective      Ruiz Boyd presents to [facility] for initial evaluation of the left hand.  He has pain in the left hand.  He has had some falls and landed on it funny.  She was trying to buckle a seat belt with his wife's wheelchair and had pain on the dorsal aspect of the wrist. He had an X ray on 1/15/25 and is here to review.  He notes the pain is better.      Allergies   Allergen Reactions    Latex, Natural Rubber Rash        Social History     Socioeconomic History    Marital status:    Tobacco Use    Smoking status: Former     Current packs/day: 0.00     Average packs/day: 2.0 packs/day for 25.0 years (50.0 ttl pk-yrs)     Types: Cigarettes     Start date: 1966     Quit date: 1991     Years since quittin.0    Smokeless tobacco: Never   Vaping Use    Vaping status: Never Used   Substance and Sexual Activity    Alcohol use: Yes     Alcohol/week: 20.0 standard drinks of alcohol     Types: 20 Cans of beer per week    Drug use: Never    Sexual activity: Yes     Partners: Female        I reviewed the patient's chief complaint, history of present illness, review of systems, past medical history, surgical history, family history, social history, medications, and allergy list.     Review of Systems     Constitutional: Denies fevers, chills, weight loss  Cardiovascular: Denies chest pain, shortness of breath  Skin: Denies rashes, acute skin changes  Neurologic: Denies headache, loss of consciousness        Vital Signs:   /61   Pulse 68   Ht 171.5 cm (67.5\")   Wt 96.2 kg (212 lb)   SpO2 93%   BMI 32.71 kg/m²          Physical Exam  General: Alert. No acute distress    Ortho Exam        LEFT HAND Negative Compression testing/ Negative Tinels. NegativeFinkelsteins. Negative Sykes's testing. Negative [facility] grind testing. Negative Phalens.MIldly Full ROM of the hand, fingers, elbow and wrist. Negative " Triggering of the digit. Sensation grossly intact to light touch, median, radial and ulnar nerve. Positive AIN, PIN and ulnar nerve motor function intact. Axillary nerve intact. Positive pulses.        Procedures      Imaging Results (Most Recent)       None             Result Review :         XR Wrist 3+ View Left    Result Date: 1/15/2025  Narrative: XR WRIST 3+ VW LEFT, XR HAND 3+ VW LEFT Date of Exam: 1/14/2025 11:32 AM EST Indication: pain/fall Comparison: None available. Findings: There is no evidence of acute fracture. Moderate base of thumb arthritis. Mild scattered IP joint arthritis. Normal joint alignment. There is a punctate radiodensity within the soft tissues of the thenar eminence, likely retained foreign body.     Impression: Impression: Punctate radiodensity within the soft tissues of the thenar eminence, likely retained foreign body. No evidence of fracture. Electronically Signed: Stas Garcia MD  1/15/2025 10:43 AM EST  Workstation ID: [id number]    XR Hand 3+ View Left    Result Date: 1/15/2025  Narrative: XR WRIST 3+ VW LEFT, XR HAND 3+ VW LEFT Date of Exam: 1/14/2025 11:32 AM EST Indication: pain/fall Comparison: None available. Findings: There is no evidence of acute fracture. Moderate base of thumb arthritis. Mild scattered IP joint arthritis. Normal joint alignment. There is a punctate radiodensity within the soft tissues of the thenar eminence, likely retained foreign body.     Impression: Impression: Punctate radiodensity within the soft tissues of the thenar eminence, likely retained foreign body. No evidence of fracture. Electronically Signed: Stas Garcia MD  1/15/2025 10:43 AM EST  Workstation ID: [id number]            Assessment and Plan     Diagnoses and all orders for this visit:    1. Contusion of left wrist, initial encounter (Primary)    2. Left wrist pain        Discussed the treatment plan with the patient. I reviewed the X-rays that were obtained 1/15/25 with the patient.      Wrist brace was given to help rest.      Call or return if worsening symptoms.    Follow Up     PRN      Patient was given instructions and counseling regarding his condition or for health maintenance advice. Please see specific information pulled into the AVS if appropriate.     Scribed for Jennifer Neil MD by Vidya Metcalf MA.  01/23/25   13:09 EST    I have personally performed the services described in this document as scribed by the above individual and it is both accurate and complete. Jennifer Neil MD 01/23/25

## 2025-04-03 NOTE — PROGRESS NOTES
Chief Complaint  Diabetes (New Patient, [facility], A1c eval)    Referred By: BRITTANEY Ho    Patient or patient representative verbalized consent for the use of Ambient Listening during the visit with  BRITTANEY Roy for chart documentation. 4/4/2025  09:42 EDT      Subjective          Ruiz Boyd presents to [facility] for diabetes medication management    History of Present Illness    History of Present Illness  The patient is a 75-year-old male who presents as a new patient to [facility]. His primary care provider, Coco Price, referred him to our clinic today.    He has been informed of his prediabetic status but is uncertain about the duration of this condition. He attributes his elevated A1c levels to stress, particularly following the hospitalization and subsequent death of his wife last month. He reports no current use of steroids or history of sleep apnea. He experiences frequent urination, which he believes is age-related, and reports a good appetite without constant hunger. He does not experience blurred vision or fatigue, although he acknowledges a decline in his vision, for which he recently acquired new glasses. He reports rapid healing and adherence to his Protonix medication regimen in prevention of GERD. He occasionally experiences constipation, which he manages through dietary adjustments, and reports no diarrhea. He has no history of pancreatitis or gastroparesis. He has never experienced a heart attack or stroke and has not been hospitalized for blood sugar issues. He reports no hypoglycemic episodes and does not monitor his blood sugar levels at home. He maintains an active lifestyle, including golf, gardening, and yard work, and plans to resume gym workouts. He does not see a podiatrist and reports no foot issues, although he notes thickened toenails. He does not monitor his carbohydrate or sugar intake and consumes sugary drinks  occasionally. He typically eats three meals a day without snacking, although he may consume fruit if he does snack. He averages one Coke per day but does not consume it daily. He has not consulted a dietitian or attended diabetes education classes and does not feel the need for these services.    He experiences neuropathic symptoms, including burning sensations in his legs and feet when standing on concrete for extended periods. He reports no back issues and notes that his symptoms are less severe when working outdoors on dirt.  He is currently taking gabapentin for his neuropathy symptoms.    Supplemental Information  He has a history of coronary artery disease, high cholesterol, high blood pressure, some hearing loss, fatty liver, and neuropathy. He has had a colonoscopy, carotid stent placement, partial colectomy, another colonoscopy, and endoscopy.    FAMILY HISTORY  His father and brother had diabetes.    ALLERGIES  He is allergic to LATEX.    MEDICATIONS  Current: Turmeric, Protonix 20 mg daily, multivitamin, metoprolol 50 mg daily, lisinopril hydrochlorothiazide 20/12.5 mg 1 tablet daily, gabapentin 300 mg twice a day, coenzyme Q10 200 mg daily, Plavix 75 mg daily, vitamin D 2000 units daily, atorvastatin 40 mg daily, baby aspirin daily, amlodipine 5 mg daily.  Discontinued: Afrin nasal spray, Mucinex.       Visit type:  to establish care  Diabetes type:  Prediabetes  Age at time of dx/Year of dx/Number of years:  reports he has been prediabetic for 2-3 years. His A1c in January was 6.7%  but his wife was sick in the hospital  Family History of Diabetes: father and brother  Current diabetes status/concerns/issues:  reports his PCP referred him to our clinic by his PCP for managemen of diabetes.   Other current health concerns: none  Current Diabetes symptoms:    Polyuria: Yes     Polydipsia: No   Polyphagia: No   Blurred vision: No   Excessive fatigue: No  Known Diabetes complications:  Neuropathy: Burning;  "Location: Feet  Renal: Stage II mild (GFR = 60-89 mL/min)  Eyes: No current eye exam available in record; Location: N/A; Last Eye Exam:  3/25 ; Location: Dr.Cecil Madera  Amputation/Wounds: None  GI: Constipation, Reflux, and Indigestion  Cardiovascular: Hypertension, Hyperlipidemia, and CAD, cardiac stents  ED: N/A  Other: None  Hospitalizations/ED/911 secondary to DM?  No  Hypoglycemia:  None reported at this time  Hypoglycemia Symptoms:  No hypoglycemia at this time  Current Diabetes treatment:  none  Prior diabetes treatments:  none  Using ACEI or ARB: Yes, lisinopril-HCTZ 20-12.5 mg 1 tab daily, Managed by other provider  Using Statin: Yes, atorvastatin 40 mg daily, Managed by other provider  Blood glucose device:  Not currently monitoring BG  Blood glucose monitoring frequency:  Not currently monitoring BG  Blood glucose range/average:  Not currently monitoring BG  Glucose Source: N/A  Dietary behavior:  \"Eat what I want\"/No diet plan, Number of meals each day - 3; Number of snacks each day - 0. Drinks one coke day and occ sweet tea.  Activity/Exercise:   golf, active outdoors. He was going to the gym prior to his wife becoming ill.   Last Foot Exam: None  Diabetes Education Hx: None  Social Determinants of Health: None    Past Medical History:   Diagnosis Date    Cataract     Colon polyp     Coronary artery disease involving native coronary artery of native heart without angina pectoris 1/14/2025    GERD (gastroesophageal reflux disease)     HL (hearing loss)     Hyperlipidemia     Hypertension     Idiopathic sensorimotor axonal neuropathy 07/21/2022    Shingles     Trigeminal neuralgia     Vision loss      Past Surgical History:   Procedure Laterality Date    CAROTID STENT      COLECTOMY PARTIAL / TOTAL      COLONOSCOPY      COLONOSCOPY N/A 6/15/2023    Procedure: COLONOSCOPY WITH POLYPECTOMY;  Surgeon: Sancho Woods MD;  Location: [facility] ENDOSCOPY;  Service: Gastroenterology;  Laterality: N/A;  " COLON POLYP ANS PREVIOUS ABDOMINAL SURGERY     Family History   Problem Relation Age of Onset    Diabetes Father     Heart attack Father     Dementia Sister     Heart attack Brother     Diabetes Brother     Stomach cancer Brother     Hypertension Brother     Colon cancer Neg Hx      Social History     Socioeconomic History    Marital status:     Number of children: 5   Tobacco Use    Smoking status: Former     Current packs/day: 0.00     Average packs/day: 2.0 packs/day for 25.0 years (50.0 ttl pk-yrs)     Types: Cigarettes     Start date: 1966     Quit date: 1991     Years since quittin.2    Smokeless tobacco: Never   Vaping Use    Vaping status: Never Used   Substance and Sexual Activity    Alcohol use: Yes     Alcohol/week: 20.0 standard drinks of alcohol     Types: 20 Cans of beer per week    Drug use: Never    Sexual activity: Yes     Partners: Female     Allergies   Allergen Reactions    Latex, Natural Rubber Rash       Current Outpatient Medications:     amLODIPine (NORVASC) 5 MG tablet, Take 1 tablet by mouth Daily., Disp: , Rfl:     aspirin 81 MG EC tablet, Take 1 tablet by mouth Daily., Disp: , Rfl:     atorvastatin (LIPITOR) 40 MG tablet, Take 1 tablet by mouth Daily., Disp: , Rfl:     Cholecalciferol (Vitamin D) 50 MCG ( UT) tablet, Take 1 tablet by mouth Daily., Disp: , Rfl:     clopidogrel (PLAVIX) 75 MG tablet, Take 1 tablet by mouth Daily., Disp: , Rfl:     Coenzyme Q10 (CoQ10) 200 MG capsule, Take  by mouth Daily., Disp: , Rfl:     gabapentin (NEURONTIN) 300 MG capsule, Take 1 capsule by mouth 3 (Three) Times a Day., Disp: 90 capsule, Rfl: 5    lisinopril-hydrochlorothiazide (PRINZIDE,ZESTORETIC) 20-12.5 MG per tablet, Take 1 tablet by mouth Daily., Disp: , Rfl:     metoprolol succinate XL (TOPROL-XL) 50 MG 24 hr tablet, Take 1 tablet by mouth Daily., Disp: 90 tablet, Rfl: 3    multivitamin tablet tablet, Take 1 tablet by mouth Daily., Disp: , Rfl:     pantoprazole (PROTONIX)  "20 MG EC tablet, Take 1 tablet by mouth Daily., Disp: , Rfl:     TURMERIC PO, Take  by mouth Daily., Disp: , Rfl:     Objective     Vitals:    04/04/25 0927   BP: 134/62   Pulse: 65   SpO2: 96%   Weight: 96 kg (211 lb 9.6 oz)   Height: 171.5 cm (67.5\")   PainSc: 0-No pain     Body mass index is 32.65 kg/m².    Physical Exam  Constitutional:       Appearance: Normal appearance. He is obese.      Comments: Obesity (BMI 30 - 39.9) Pt Current BMI = 32.65     HENT:      Head: Normocephalic and atraumatic.      Right Ear: External ear normal.      Left Ear: External ear normal.      Nose: Nose normal.   Eyes:      Extraocular Movements: Extraocular movements intact.      Conjunctiva/sclera: Conjunctivae normal.   Pulmonary:      Effort: Pulmonary effort is normal.   Musculoskeletal:         General: Normal range of motion.      Cervical back: Normal range of motion.   Skin:     General: Skin is warm and dry.   Neurological:      General: No focal deficit present.      Mental Status: He is alert and oriented to person, place, and time. Mental status is at baseline.   Psychiatric:         Mood and Affect: Mood normal.         Behavior: Behavior normal.         Thought Content: Thought content normal.         Judgment: Judgment normal.             Result Review :   The following data was reviewed by: BRITTANEY Roy on 04/04/2025:    Most Recent A1C          4/4/2025    09:35   HGBA1C Most Recent   Hemoglobin A1C 6.1        A1C Last 3 Results          1/17/2025    07:41 4/4/2025    09:35   HGBA1C Last 3 Results   Hemoglobin A1C 6.70  6.1      A1c collected in the office today is 6.1%, indicating prediabetes.  This result is down from the prior result of 6.7% collected on 1/17/2025.          Creatinine   Date Value Ref Range Status   01/17/2025 1.16 0.76 - 1.27 mg/dL Final   07/21/2022 1.15 0.76 - 1.27 mg/dL Final     eGFR   Date Value Ref Range Status   01/17/2025 65.3 >60.0 mL/min/1.73 Final   07/21/2022 66.8 " >60.0 mL/min/1.73 Final     Comment:     National Kidney Foundation and American Society of Nephrology (ASN) Task Force recommended calculation based on the Chronic Kidney Disease Epidemiology Collaboration (CKD-EPI) equation refit without adjustment for race.     Labs collected on 1/17/2025 show Stage II mild (GFR = 60-89mL/min)      Total Cholesterol   Date Value Ref Range Status   01/17/2025 113 0 - 200 mg/dL Final     Triglycerides   Date Value Ref Range Status   01/17/2025 62 0 - 150 mg/dL Final     HDL Cholesterol   Date Value Ref Range Status   01/17/2025 42 40 - 60 mg/dL Final     LDL Cholesterol    Date Value Ref Range Status   01/17/2025 57 0 - 100 mg/dL Final     Lipid panel collected on 1/17/2025 shows normal lipid panel                Diagnoses and all orders for this visit:    1. Prediabetes (Primary)    2. Elevated glucose  -     POC Glycosylated Hemoglobin (Hb A1C)    3. Obesity (BMI 30-39.9)    4. Neuropathy          Assessment & Plan  1. Prediabetes.  His A1c level was recorded at 6.7 in January 2025, indicating a diagnosis of diabetes. However, his current A1c level has decreased to 6.1, placing him in the prediabetic range. It was explained that factors such as stress and infection can elevate A1c levels. His kidney function appears to be within normal limits. He was advised to monitor his carbohydrate and sugar intake, limit consumption of white bread, pasta, rice, potatoes, sugary drinks, and snacks. Regular exercise was also recommended.     2. Neuropathy.  He experiences burning sensations in his legs and feet when standing on concrete for extended periods. He is currently taking gabapentin 300 mg twice a day, although it is prescribed for three times a day. He was advised to continue with the current dosage and avoid standing on concrete for prolonged periods.    Follow-up  The patient will follow up in 3 months.    PROCEDURE  The patient has undergone a colonoscopy, carotid stent placement,  partial colectomy, another colonoscopy, and endoscopy in the past.          Follow Up     Return in about 3 months (around 7/4/2025) for Medication Mgmt.    Patient was given instructions and counseling regarding his condition or for health maintenance advice. Please see specific information pulled into the AVS if appropriate.     Shira Gaffney, BRITTANEY  04/04/2025      Dictated Utilizing Dragon Dictation.  Please note that portions of this note were completed with a voice recognition program.  Part of this note may be an electronic transcription/translation of spoken language to printed text using the Dragon Dictation System.

## 2025-04-04 ENCOUNTER — OFFICE VISIT (OUTPATIENT)
Dept: DIABETES SERVICES | Facility: CLINIC | Age: 75
End: 2025-04-04
Payer: MEDICARE

## 2025-04-04 VITALS
WEIGHT: 211.6 LBS | SYSTOLIC BLOOD PRESSURE: 134 MMHG | BODY MASS INDEX: 32.07 KG/M2 | HEIGHT: 68 IN | DIASTOLIC BLOOD PRESSURE: 62 MMHG | HEART RATE: 65 BPM | OXYGEN SATURATION: 96 %

## 2025-04-04 DIAGNOSIS — G62.9 NEUROPATHY: ICD-10-CM

## 2025-04-04 DIAGNOSIS — R73.03 PREDIABETES: Primary | ICD-10-CM

## 2025-04-04 DIAGNOSIS — R73.09 ELEVATED GLUCOSE: ICD-10-CM

## 2025-04-04 DIAGNOSIS — E66.9 OBESITY (BMI 30-39.9): ICD-10-CM

## 2025-04-04 LAB
EXPIRATION DATE: ABNORMAL
HBA1C MFR BLD: 6.1 % (ref 4.5–5.7)
Lab: ABNORMAL

## 2025-04-07 ENCOUNTER — PATIENT ROUNDING (BHMG ONLY) (OUTPATIENT)
Dept: DIABETES SERVICES | Facility: HOSPITAL | Age: 75
End: 2025-04-07
Payer: MEDICARE

## 2025-04-07 NOTE — PROGRESS NOTES
April 7, 2025    Hello, may I speak with Ruiz Boyd?    My name is Pamella Boyd      I am  with [facility]  [address] [place] [address] [phone].    Before we get started may I verify your date of birth? 1950    I am calling to officially welcome you to our practice and ask about your recent visit. Is this a good time to talk? yes    Tell me about your visit with us. What things went well?  Everything       We're always looking for ways to make our patients' experiences even better. Do you have recommendations on ways we may improve?  no    Overall were you satisfied with your first visit to our practice? yes       I appreciate you taking the time to speak with me today. Is there anything else I can do for you? no      Thank you, and have a great day.

## 2025-04-18 ENCOUNTER — PATIENT MESSAGE (OUTPATIENT)
Dept: NEUROLOGY | Facility: CLINIC | Age: 75
End: 2025-04-18
Payer: MEDICARE

## 2025-05-05 ENCOUNTER — OFFICE VISIT (OUTPATIENT)
Dept: FAMILY MEDICINE CLINIC | Age: 75
End: 2025-05-05
Payer: MEDICARE

## 2025-05-05 VITALS
TEMPERATURE: 98.3 F | SYSTOLIC BLOOD PRESSURE: 123 MMHG | DIASTOLIC BLOOD PRESSURE: 71 MMHG | OXYGEN SATURATION: 93 % | HEART RATE: 68 BPM | HEIGHT: 68 IN | WEIGHT: 216 LBS | BODY MASS INDEX: 32.74 KG/M2

## 2025-05-05 DIAGNOSIS — L03.011 PARONYCHIA OF FINGER OF RIGHT HAND: Primary | ICD-10-CM

## 2025-05-05 PROCEDURE — 3078F DIAST BP <80 MM HG: CPT | Performed by: NURSE PRACTITIONER

## 2025-05-05 PROCEDURE — 1126F AMNT PAIN NOTED NONE PRSNT: CPT | Performed by: NURSE PRACTITIONER

## 2025-05-05 PROCEDURE — 87205 SMEAR GRAM STAIN: CPT | Performed by: NURSE PRACTITIONER

## 2025-05-05 PROCEDURE — 1159F MED LIST DOCD IN RCRD: CPT | Performed by: NURSE PRACTITIONER

## 2025-05-05 PROCEDURE — 3074F SYST BP LT 130 MM HG: CPT | Performed by: NURSE PRACTITIONER

## 2025-05-05 PROCEDURE — 99213 OFFICE O/P EST LOW 20 MIN: CPT | Performed by: NURSE PRACTITIONER

## 2025-05-05 PROCEDURE — 87070 CULTURE OTHR SPECIMN AEROBIC: CPT | Performed by: NURSE PRACTITIONER

## 2025-05-05 PROCEDURE — 1160F RVW MEDS BY RX/DR IN RCRD: CPT | Performed by: NURSE PRACTITIONER

## 2025-05-05 RX ORDER — CEPHALEXIN 500 MG/1
500 CAPSULE ORAL 3 TIMES DAILY
Qty: 21 CAPSULE | Refills: 0 | Status: SHIPPED | OUTPATIENT
Start: 2025-05-05 | End: 2025-05-12

## 2025-05-05 NOTE — PROGRESS NOTES
"Chief Complaint  Nail Problem (Nail Infection on thumb of right hand x 3 days)    Subjective          Ruiz Boyd presents to [facility] for an acute visit complaining of infection to right thumb nail x 3 days. Has been soaking in warm espom salt. Denies fever, chills. Denies drainage.     Objective   Vital Signs:   Vitals:    05/05/25 1318   BP: 123/71   Pulse: 68   Temp: 98.3 °F (36.8 °C)   TempSrc: Oral   SpO2: 93%   Weight: 98 kg (216 lb)   Height: 171.5 cm (67.52\")       Body mass index is 33.31 kg/m².         Physical Exam  Constitutional:       General: He is not in acute distress.     Appearance: Normal appearance. He is not ill-appearing.   Pulmonary:      Effort: Pulmonary effort is normal. No respiratory distress.   Neurological:      Mental Status: He is alert and oriented to person, place, and time.   Psychiatric:         Mood and Affect: Mood normal.         Behavior: Behavior normal.         Thought Content: Thought content normal.         Judgment: Judgment normal.                 Incision & Drainage    Date/Time: 5/26/2025 11:12 PM    Performed by: Coco Price APRN  Authorized by: Coco Price APRN  Type: abscess  Body area: upper extremity  Location details: right thumb  Anesthesia method: No anesthesia.    Sedation:  Patient sedated: no    Needle gauge: 22  Incision type: single straight  Drainage: purulent  Drainage amount: scant  Wound treatment: wound left open  Packing material: none  Patient tolerance: patient tolerated the procedure well with no immediate complications  Comments: Covered with bandaid.             Assessment and Plan    Assessment & Plan  Paronychia of finger of right hand  Complete rx abx. Continue to soak. Monitor for worsening infection.   Orders:    Wound Culture - Wound, Hand, Digit Right; Future    Other orders    cephalexin (KEFLEX) 500 MG capsule; Take 1 capsule by mouth 3 (Three) Times a Day for 7 days.    Incision & " Drainage        Patient to notify office with any acute concerns or issues.  Patient verbalizes understanding, agrees with plan of care and has no further questions upon discharge.    Please note that portions of this note were completed with a voice recognition program.      Follow Up    Return if symptoms worsen or fail to improve.  Patient was given instructions and counseling regarding his condition or for health maintenance advice. Please see specific information pulled into the AVS if appropriate.       Current Outpatient Medications:     amLODIPine (NORVASC) 5 MG tablet, Take 1 tablet by mouth Daily., Disp: , Rfl:     aspirin 81 MG EC tablet, Take 1 tablet by mouth Daily., Disp: , Rfl:     atorvastatin (LIPITOR) 40 MG tablet, Take 1 tablet by mouth Daily., Disp: , Rfl:     Cholecalciferol (Vitamin D) 50 MCG (2000 UT) tablet, Take 1 tablet by mouth Daily., Disp: , Rfl:     clopidogrel (PLAVIX) 75 MG tablet, Take 1 tablet by mouth Daily., Disp: , Rfl:     Coenzyme Q10 (CoQ10) 200 MG capsule, Take  by mouth Daily., Disp: , Rfl:     lisinopril-hydrochlorothiazide (PRINZIDE,ZESTORETIC) 20-12.5 MG per tablet, Take 1 tablet by mouth Daily., Disp: , Rfl:     metoprolol succinate XL (TOPROL-XL) 50 MG 24 hr tablet, Take 1 tablet by mouth Daily., Disp: 90 tablet, Rfl: 3    multivitamin tablet tablet, Take 1 tablet by mouth Daily., Disp: , Rfl:     pantoprazole (PROTONIX) 20 MG EC tablet, Take 1 tablet by mouth Daily., Disp: , Rfl:     TURMERIC PO, Take  by mouth Daily., Disp: , Rfl:     gabapentin (NEURONTIN) 300 MG capsule, Take 1 capsule by mouth 3 (Three) Times a Day., Disp: 90 capsule, Rfl: 5  There are no discontinued medications.

## 2025-05-09 LAB
BACTERIA SPEC AEROBE CULT: ABNORMAL
BACTERIA SPEC AEROBE CULT: ABNORMAL
GRAM STN SPEC: ABNORMAL

## 2025-05-13 ENCOUNTER — RESULTS FOLLOW-UP (OUTPATIENT)
Dept: FAMILY MEDICINE CLINIC | Age: 75
End: 2025-05-13
Payer: MEDICARE

## 2025-05-20 DIAGNOSIS — G60.8 IDIOPATHIC SENSORIMOTOR AXONAL NEUROPATHY: ICD-10-CM

## 2025-05-20 RX ORDER — GABAPENTIN 300 MG/1
300 CAPSULE ORAL 3 TIMES DAILY
Qty: 90 CAPSULE | Refills: 5 | Status: SHIPPED | OUTPATIENT
Start: 2025-05-20

## 2025-05-20 NOTE — TELEPHONE ENCOUNTER
Caller: Ruiz Boyd    Relationship: Self    Best call back number: [phone]    Requested Prescriptions:   Requested Prescriptions     Pending Prescriptions Disp Refills    gabapentin (NEURONTIN) 300 MG capsule 90 capsule 5     Sig: Take 1 capsule by mouth 3 (Three) Times a Day.        Pharmacy where request should be sent: [facility] [address] [phone] [facility] [phone]      Last office visit with prescribing clinician: 5/5/2025   Last telemedicine visit with prescribing clinician: Visit date not found   Next office visit with prescribing clinician: 7/14/2025     Additional details provided by patient: PATIENT STATED BRITTANEY MEYERSGAGEJENNA AGREED TO TAKE OVER THIS SCRIPT FOR HIM     Does the patient have less than a 3 day supply:  [] Yes  [x] No      Davide Dahl Rep   05/20/25 09:57 EDT

## 2025-05-20 NOTE — TELEPHONE ENCOUNTER
Controlled refill request:  Requested Prescriptions     Pending Prescriptions Disp Refills    gabapentin (NEURONTIN) 300 MG capsule 90 capsule 5     Sig: Take 1 capsule by mouth 3 (Three) Times a Day.      Last OV:  5/5/2025  Next OV:  7/14/2025  Last fill:  11/7/24  Last tox:  1/14/25

## 2025-05-26 PROCEDURE — 26010 DRAINAGE OF FINGER ABSCESS: CPT | Performed by: NURSE PRACTITIONER

## 2025-07-07 ENCOUNTER — TELEPHONE (OUTPATIENT)
Dept: FAMILY MEDICINE CLINIC | Age: 75
End: 2025-07-07
Payer: MEDICARE

## 2025-07-09 ENCOUNTER — LAB (OUTPATIENT)
Dept: LAB | Facility: HOSPITAL | Age: 75
End: 2025-07-09
Payer: MEDICARE

## 2025-07-09 DIAGNOSIS — E78.2 MIXED HYPERLIPIDEMIA: ICD-10-CM

## 2025-07-09 DIAGNOSIS — E11.65 TYPE 2 DIABETES MELLITUS WITH HYPERGLYCEMIA, WITHOUT LONG-TERM CURRENT USE OF INSULIN: ICD-10-CM

## 2025-07-09 LAB
ALBUMIN SERPL-MCNC: 4.2 G/DL (ref 3.5–5.2)
ALBUMIN/GLOB SERPL: 1.5 G/DL
ALP SERPL-CCNC: 80 U/L (ref 39–117)
ALT SERPL W P-5'-P-CCNC: 18 U/L (ref 1–41)
ANION GAP SERPL CALCULATED.3IONS-SCNC: 12 MMOL/L (ref 5–15)
AST SERPL-CCNC: 25 U/L (ref 1–40)
BILIRUB SERPL-MCNC: 0.3 MG/DL (ref 0–1.2)
BUN SERPL-MCNC: 17 MG/DL (ref 8–23)
BUN/CREAT SERPL: 16.8 (ref 7–25)
CALCIUM SPEC-SCNC: 9 MG/DL (ref 8.6–10.5)
CHLORIDE SERPL-SCNC: 101 MMOL/L (ref 98–107)
CHOLEST SERPL-MCNC: 161 MG/DL (ref 0–200)
CO2 SERPL-SCNC: 26 MMOL/L (ref 22–29)
CREAT SERPL-MCNC: 1.01 MG/DL (ref 0.76–1.27)
EGFRCR SERPLBLD CKD-EPI 2021: 76.6 ML/MIN/1.73
GLOBULIN UR ELPH-MCNC: 2.8 GM/DL
GLUCOSE SERPL-MCNC: 113 MG/DL (ref 65–99)
HBA1C MFR BLD: 6.4 % (ref 4.8–5.6)
HDLC SERPL-MCNC: 61 MG/DL (ref 40–60)
LDLC SERPL CALC-MCNC: 80 MG/DL (ref 0–100)
LDLC/HDLC SERPL: 1.28 {RATIO}
POTASSIUM SERPL-SCNC: 4.4 MMOL/L (ref 3.5–5.2)
PROT SERPL-MCNC: 7 G/DL (ref 6–8.5)
SODIUM SERPL-SCNC: 139 MMOL/L (ref 136–145)
TRIGL SERPL-MCNC: 109 MG/DL (ref 0–150)
VLDLC SERPL-MCNC: 20 MG/DL (ref 5–40)

## 2025-07-09 PROCEDURE — 36415 COLL VENOUS BLD VENIPUNCTURE: CPT

## 2025-07-09 PROCEDURE — 80061 LIPID PANEL: CPT

## 2025-07-09 PROCEDURE — 83036 HEMOGLOBIN GLYCOSYLATED A1C: CPT

## 2025-07-09 PROCEDURE — 80053 COMPREHEN METABOLIC PANEL: CPT

## 2025-07-14 ENCOUNTER — OFFICE VISIT (OUTPATIENT)
Dept: FAMILY MEDICINE CLINIC | Age: 75
End: 2025-07-14
Payer: MEDICARE

## 2025-07-14 VITALS
OXYGEN SATURATION: 96 % | HEART RATE: 83 BPM | SYSTOLIC BLOOD PRESSURE: 131 MMHG | WEIGHT: 212.4 LBS | HEIGHT: 68 IN | DIASTOLIC BLOOD PRESSURE: 62 MMHG | TEMPERATURE: 98.1 F | BODY MASS INDEX: 32.19 KG/M2

## 2025-07-14 DIAGNOSIS — G60.8 IDIOPATHIC SENSORIMOTOR AXONAL NEUROPATHY: ICD-10-CM

## 2025-07-14 DIAGNOSIS — Z79.899 HIGH RISK MEDICATION USE: Primary | ICD-10-CM

## 2025-07-14 DIAGNOSIS — I10 PRIMARY HYPERTENSION: ICD-10-CM

## 2025-07-14 DIAGNOSIS — E78.2 MIXED HYPERLIPIDEMIA: ICD-10-CM

## 2025-07-14 DIAGNOSIS — E11.65 TYPE 2 DIABETES MELLITUS WITH HYPERGLYCEMIA, WITHOUT LONG-TERM CURRENT USE OF INSULIN: ICD-10-CM

## 2025-07-14 PROCEDURE — 99214 OFFICE O/P EST MOD 30 MIN: CPT | Performed by: NURSE PRACTITIONER

## 2025-07-14 PROCEDURE — 3075F SYST BP GE 130 - 139MM HG: CPT | Performed by: NURSE PRACTITIONER

## 2025-07-14 PROCEDURE — 1160F RVW MEDS BY RX/DR IN RCRD: CPT | Performed by: NURSE PRACTITIONER

## 2025-07-14 PROCEDURE — 3078F DIAST BP <80 MM HG: CPT | Performed by: NURSE PRACTITIONER

## 2025-07-14 PROCEDURE — 1159F MED LIST DOCD IN RCRD: CPT | Performed by: NURSE PRACTITIONER

## 2025-07-14 PROCEDURE — 1126F AMNT PAIN NOTED NONE PRSNT: CPT | Performed by: NURSE PRACTITIONER

## 2025-07-14 PROCEDURE — G2211 COMPLEX E/M VISIT ADD ON: HCPCS | Performed by: NURSE PRACTITIONER

## 2025-07-14 NOTE — ASSESSMENT & PLAN NOTE
Stable on current regimen.  Symptoms are well controlled.  No adverse effects. He does require ongoing use of this controlled substance to function.  Tox screen was due today.  Prior tox screen appropriate.  BRENDA was run today.  Refills were not needed today.  RTC 6 months. Controlled substance agreement is already on file..

## 2025-07-14 NOTE — ASSESSMENT & PLAN NOTE
{Hyperlipidemia A/P Block (Optional):[id number]}Stable on atorvastatin 40mg daily.  Heart healthy diet. Routine exercise and weight loss. Continue to monitor.

## 2025-07-14 NOTE — PROGRESS NOTES
"Chief Complaint  Hypertension, Diabetes, and Hyperlipidemia    Subjective          Ruiz Boyd presents to [facility] for routine f/u.  Patient is taking amlodipine 5 mg, Toprol-XL 50 mg, lisinopril/HCTZ 20/12.5 mg daily for hypertension.  Blood pressure is well-controlled.  He is on Plavix 75 mg daily. Patient has known history of CAD with previous PCI's of the LAD and left circumflex.  He sees BRITTANEY Ma with cardiology.  Patient is also on 81 mg aspirin daily.  He is taking atorvastatin 40 mg daily and co-Q10.  GERD controlled with Protonix 20 mg daily.  Diabetes controlled with diet.  Last A1c was 6.4.    Tooth extraction planned soon. Dr. Howard will contact cardiology regarding the plavix.        Objective   Vital Signs:   Vitals:    07/14/25 0839   BP: 131/62   BP Location: Left arm   Patient Position: Sitting   Cuff Size: Adult   Pulse: 83   Temp: 98.1 °F (36.7 °C)   TempSrc: Oral   SpO2: 96%   Weight: 96.3 kg (212 lb 6.4 oz)   Height: 171.5 cm (67.52\")       Body mass index is 32.76 kg/m².        Physical Exam  Vitals reviewed.   Constitutional:       General: He is not in acute distress.     Appearance: He is well-developed.   HENT:      Head: Normocephalic and atraumatic.   Eyes:      Conjunctiva/sclera: Conjunctivae normal.      Pupils: Pupils are equal, round, and reactive to light.   Neck:      Vascular: No carotid bruit.   Cardiovascular:      Rate and Rhythm: Normal rate and regular rhythm.      Heart sounds: Normal heart sounds. No murmur heard.  Pulmonary:      Effort: Pulmonary effort is normal. No respiratory distress.      Breath sounds: Normal breath sounds.   Skin:     General: Skin is warm and dry.   Neurological:      Mental Status: He is alert and oriented to person, place, and time.   Psychiatric:         Mood and Affect: Mood and affect normal.         Behavior: Behavior normal.         Thought Content: Thought content normal.         Judgment: " Judgment normal.            Lab Results   Component Value Date    GLUCOSE 113 (H) 07/09/2025    BUN 17.0 07/09/2025    CREATININE 1.01 07/09/2025    EGFR 76.6 07/09/2025    BCR 16.8 07/09/2025    K 4.4 07/09/2025    CO2 26.0 07/09/2025    CALCIUM 9.0 07/09/2025    ALBUMIN 4.2 07/09/2025    BILITOT 0.3 07/09/2025    AST 25 07/09/2025    ALT 18 07/09/2025     Lab Results   Component Value Date    HGBA1C 6.40 (H) 07/09/2025     Lab Results   Component Value Date    WBC 5.29 01/17/2025    HGB 13.9 01/17/2025    HCT 42.2 01/17/2025    MCV 92.5 01/17/2025     01/17/2025     Lab Results   Component Value Date    CHOL 161 07/09/2025    CHOL 113 01/17/2025     Lab Results   Component Value Date    TRIG 109 07/09/2025    TRIG 62 01/17/2025     Lab Results   Component Value Date    HDL 61 (H) 07/09/2025    HDL 42 01/17/2025     Lab Results   Component Value Date    LDL 80 07/09/2025    LDL 57 01/17/2025     Lab Results   Component Value Date    TSH 2.270 01/17/2025     Labs reviewed with patient during appointment.                 Assessment and Plan    Assessment & Plan  Type 2 diabetes mellitus with hyperglycemia, without long-term current use of insulin   Diet controlled. Ha1c was 6.4. Message sent to cancel appt with BRITTANEY Crouch.     Orders:    Comprehensive Metabolic Panel; Future    Lipid Panel; Future    Hemoglobin A1c; Future    Microalbumin / Creatinine Urine Ratio - Urine, Clean Catch; Future    Primary hypertension  Stable.  Continue amlodipine 5mg, toprol xl 5mg and lisinopril/hctz 20/12.5mg daily.  Low-sodium diet.  Routine exercise and weight loss. Monitor BP at home. Goal less than 130/80. Continue follow-up with cardiology.         Mixed hyperlipidemia   Stable on atorvastatin 40mg daily.  Heart healthy diet. Routine exercise and weight loss. Continue to monitor.         Idiopathic sensorimotor axonal neuropathy  Stable on current regimen.  Symptoms are well controlled.  No adverse effects. He  does require ongoing use of this controlled substance to function.  Tox screen was due today.  Prior tox screen appropriate.  BRENDA was run today.  Refills were not needed today.  RTC 6 months. Controlled substance agreement is already on file..       High risk medication use  See above.   Orders:    POC Medline 12 Panel Urine Drug Screen         Lab orders placed for next routine follow up.      Patient to notify office with any acute concerns or issues.  Patient verbalizes understanding, agrees with plan of care and has no further questions upon discharge.    Please note that portions of this note were completed with a voice recognition program.      Follow Up    Return in about 6 months (around 1/14/2026) for Recheck.  Patient was given instructions and counseling regarding his condition or for health maintenance advice. Please see specific information pulled into the AVS if appropriate.       Current Outpatient Medications:     amLODIPine (NORVASC) 5 MG tablet, Take 1 tablet by mouth Daily., Disp: , Rfl:     aspirin 81 MG EC tablet, Take 1 tablet by mouth Daily., Disp: , Rfl:     atorvastatin (LIPITOR) 40 MG tablet, Take 1 tablet by mouth Daily., Disp: , Rfl:     Cholecalciferol (Vitamin D) 50 MCG (2000 UT) tablet, Take 1 tablet by mouth Daily., Disp: , Rfl:     clopidogrel (PLAVIX) 75 MG tablet, Take 1 tablet by mouth Daily., Disp: , Rfl:     Coenzyme Q10 (CoQ10) 200 MG capsule, Take  by mouth Daily., Disp: , Rfl:     gabapentin (NEURONTIN) 300 MG capsule, Take 1 capsule by mouth 3 (Three) Times a Day., Disp: 90 capsule, Rfl: 5    lisinopril-hydrochlorothiazide (PRINZIDE,ZESTORETIC) 20-12.5 MG per tablet, Take 1 tablet by mouth Daily., Disp: , Rfl:     metoprolol succinate XL (TOPROL-XL) 50 MG 24 hr tablet, Take 1 tablet by mouth Daily., Disp: 90 tablet, Rfl: 3    multivitamin tablet tablet, Take 1 tablet by mouth Daily., Disp: , Rfl:     pantoprazole (PROTONIX) 20 MG EC tablet, Take 1 tablet by mouth Daily.,  Disp: , Rfl:     TURMERIC PO, Take  by mouth Daily., Disp: , Rfl:       There are no discontinued medications.

## 2025-07-14 NOTE — ASSESSMENT & PLAN NOTE
{Hypertension is (optional):[id number]}Stable.  Continue amlodipine 5mg, toprol xl 5mg and lisinopril/hctz 20/12.5mg daily.  Low-sodium diet.  Routine exercise and weight loss. Monitor BP at home. Goal less than 130/80. Continue follow-up with cardiology.

## 2025-07-14 NOTE — ASSESSMENT & PLAN NOTE
{Diabetes (Optional):5393873343} Diet controlled. Ha1c was 6.4. Message sent to cancel appt with BRITTANEY Crouch.     Orders:    Comprehensive Metabolic Panel; Future    Lipid Panel; Future    Hemoglobin A1c; Future    Microalbumin / Creatinine Urine Ratio - Urine, Clean Catch; Future

## (undated) DEVICE — SOL IRRG H2O PL/BG 1000ML STRL

## (undated) DEVICE — THE SINGLE USE ETRAP – POLYP TRAP IS USED FOR SUCTION RETRIEVAL OF ENDOSCOPICALLY REMOVED POLYPS.: Brand: ETRAP

## (undated) DEVICE — Device: Brand: DEFENDO AIR/WATER/SUCTION AND BIOPSY VALVE

## (undated) DEVICE — SOLIDIFIER LIQLOC PLS 1500CC BT

## (undated) DEVICE — SNAR E/S POLYP SNAREMASTER OVL/10MM 2.8X2300MM YEL

## (undated) DEVICE — Device